# Patient Record
Sex: MALE | Race: WHITE | NOT HISPANIC OR LATINO | Employment: FULL TIME | URBAN - METROPOLITAN AREA
[De-identification: names, ages, dates, MRNs, and addresses within clinical notes are randomized per-mention and may not be internally consistent; named-entity substitution may affect disease eponyms.]

---

## 2023-11-28 ENCOUNTER — HOSPITAL ENCOUNTER (OUTPATIENT)
Dept: GENERAL RADIOLOGY | Facility: HOSPITAL | Age: 58
Discharge: HOME | End: 2023-11-28
Payer: COMMERCIAL

## 2023-11-28 ENCOUNTER — HOSPITAL ENCOUNTER (OUTPATIENT)
Dept: GENERAL RADIOLOGY | Facility: HOSPITAL | Age: 58
Discharge: HOME | End: 2023-11-28
Admitting: SURGERY
Payer: COMMERCIAL

## 2023-11-28 ENCOUNTER — OFFICE VISIT (OUTPATIENT)
Dept: SURGERY | Facility: CLINIC | Age: 58
End: 2023-11-28
Payer: COMMERCIAL

## 2023-11-28 VITALS
DIASTOLIC BLOOD PRESSURE: 88 MMHG | BODY MASS INDEX: 30.04 KG/M2 | HEIGHT: 68 IN | SYSTOLIC BLOOD PRESSURE: 146 MMHG | WEIGHT: 198.2 LBS

## 2023-11-28 DIAGNOSIS — R20.0 NUMBNESS AND TINGLING OF RIGHT LEG: ICD-10-CM

## 2023-11-28 DIAGNOSIS — R20.2 NUMBNESS AND TINGLING OF RIGHT ARM: ICD-10-CM

## 2023-11-28 DIAGNOSIS — R20.2 NUMBNESS AND TINGLING OF RIGHT LEG: ICD-10-CM

## 2023-11-28 DIAGNOSIS — K40.90 NON-RECURRENT UNILATERAL INGUINAL HERNIA WITHOUT OBSTRUCTION OR GANGRENE: ICD-10-CM

## 2023-11-28 DIAGNOSIS — R20.2 NUMBNESS AND TINGLING OF RIGHT ARM: Primary | ICD-10-CM

## 2023-11-28 DIAGNOSIS — R20.0 NUMBNESS AND TINGLING OF RIGHT ARM: ICD-10-CM

## 2023-11-28 DIAGNOSIS — R20.0 NUMBNESS AND TINGLING OF RIGHT ARM: Primary | ICD-10-CM

## 2023-11-28 PROBLEM — E66.9 OBESITY (BMI 30.0-34.9): Status: ACTIVE | Noted: 2023-11-28

## 2023-11-28 PROCEDURE — 1160F RVW MEDS BY RX/DR IN RCRD: CPT | Performed by: SURGERY

## 2023-11-28 PROCEDURE — 72070 X-RAY EXAM THORAC SPINE 2VWS: CPT

## 2023-11-28 PROCEDURE — 72100 X-RAY EXAM L-S SPINE 2/3 VWS: CPT

## 2023-11-28 PROCEDURE — 72040 X-RAY EXAM NECK SPINE 2-3 VW: CPT

## 2023-11-28 PROCEDURE — 99204 OFFICE O/P NEW MOD 45 MIN: CPT | Performed by: SURGERY

## 2023-11-28 PROCEDURE — 1159F MED LIST DOCD IN RCRD: CPT | Performed by: SURGERY

## 2023-11-28 RX ORDER — POLYETHYLENE GLYCOL 3350 17 G/17G
17 POWDER, FOR SOLUTION ORAL DAILY
Qty: 60 EACH | Refills: 2 | Status: SHIPPED | OUTPATIENT
Start: 2023-11-28

## 2023-11-28 RX ORDER — OXYCODONE HCL 10 MG/1
10 TABLET, FILM COATED, EXTENDED RELEASE ORAL ONCE
OUTPATIENT
Start: 2023-11-28 | End: 2023-11-28

## 2023-11-28 RX ORDER — LISINOPRIL 40 MG/1
1 TABLET ORAL DAILY
COMMUNITY
Start: 2023-09-21

## 2023-11-28 RX ORDER — HYDROCHLOROTHIAZIDE 12.5 MG/1
1 TABLET ORAL DAILY
COMMUNITY
Start: 2023-11-14

## 2023-11-28 RX ORDER — FLUTICASONE FUROATE, UMECLIDINIUM BROMIDE AND VILANTEROL TRIFENATATE 100; 62.5; 25 UG/1; UG/1; UG/1
1 POWDER RESPIRATORY (INHALATION) DAILY
COMMUNITY
Start: 2023-11-07

## 2023-11-28 RX ORDER — AMLODIPINE BESYLATE 10 MG/1
TABLET ORAL
COMMUNITY
Start: 2023-10-30

## 2023-11-28 RX ORDER — ONDANSETRON 2 MG/ML
4 INJECTION INTRAMUSCULAR; INTRAVENOUS EVERY 6 HOURS PRN
OUTPATIENT
Start: 2023-11-28

## 2023-11-28 RX ORDER — SCOLOPAMINE TRANSDERMAL SYSTEM 1 MG/1
1 PATCH, EXTENDED RELEASE TRANSDERMAL CONTINUOUS
OUTPATIENT
Start: 2023-11-28 | End: 2023-12-01

## 2023-11-28 RX ORDER — AMOXICILLIN 250 MG
1 CAPSULE ORAL DAILY
Qty: 90 TABLET | Refills: 2 | Status: SHIPPED | OUTPATIENT
Start: 2023-11-28

## 2023-11-28 NOTE — PATIENT INSTRUCTIONS
A high fiber diet with plenty of fluids (up to 8 glasses of water daily) is suggested to relieve these symptoms.  Metamucil, 1 tablespoon once or twice daily can be used to keep bowels regular if needed.       High-Fiber Diet  Fiber, also called dietary fiber, is a type of carbohydrate found in fruits, vegetables, whole grains, and beans. A high-fiber diet can have many health benefits. Your health care provider may recommend a high-fiber diet to help:  Prevent constipation. Fiber can make your bowel movements more regular.  Lower your cholesterol.  Relieve hemorrhoids, uncomplicated diverticulosis, or irritable bowel syndrome.  Prevent overeating as part of a weight-loss plan.  Prevent heart disease, type 2 diabetes, and certain cancers.    WHAT IS MY PLAN?  The recommended daily intake of fiber includes:  38 grams for men under age 50.  30 grams for men over age 50.  25 grams for women under age 50.  21 grams for women over age 50.  You can get the recommended daily intake of dietary fiber by eating a variety of fruits, vegetables, grains, and beans. Your health care provider may also recommend a fiber supplement if it is not possible to get enough fiber through your diet.    WHAT DO I NEED TO KNOW ABOUT A HIGH-FIBER DIET?  Fiber supplements have not been widely studied for their effectiveness, so it is better to get fiber through food sources.  Always check the fiber content on the nutrition facts label of any prepackaged food. Look for foods that contain at least 5 grams of fiber per serving.  Ask your dietitian if you have questions about specific foods that are related to your condition, especially if those foods are not listed in the following section.  Increase your daily fiber consumption gradually. Increasing your intake of dietary fiber too quickly may cause bloating, cramping, or gas.  Drink plenty of water. Water helps you to digest fiber.    WHAT FOODS CAN I EAT?  Grains  Whole-grain breads. Multigrain  cereal. Oats and oatmeal. Brown rice. Barley. Bulgur wheat. Millet. Bran muffins. Popcorn. Rye wafer crackers.  Vegetables  Sweet potatoes. Spinach. Kale. Artichokes. Cabbage. Broccoli. Green peas. Carrots. Squash.  Fruits  Berries. Pears. Apples. Oranges. Avocados. Prunes and raisins. Dried figs.  Meats and Other Protein Sources  Navy, kidney, garcia, and soy beans. Split peas. Lentils. Nuts and seeds.  Dairy  Fiber-fortified yogurt.  Beverages  Fiber-fortified soy milk. Fiber-fortified orange juice.  Other  Fiber bars.  The items listed above may not be a complete list of recommended foods or beverages. Contact your dietitian for more options.  WHAT FOODS ARE NOT RECOMMENDED?  Grains  White bread. Pasta made with refined flour. White rice.  Vegetables  Fried potatoes. Canned vegetables. Well-cooked vegetables.   Fruits  Fruit juice. Cooked, strained fruit.  Meats and Other Protein Sources  Fatty cuts of meat. Fried poultry or fried fish.  Dairy  Milk. Yogurt. Cream cheese. Sour cream.  Beverages  Soft drinks.  Other  Cakes and pastries. Butter and oils.  The items listed above may not be a complete list of foods and beverages to avoid. Contact your dietitian for more information.    WHAT ARE SOME TIPS FOR INCLUDING HIGH-FIBER FOODS IN MY DIET?  Eat a wide variety of high-fiber foods.  Make sure that half of all grains consumed each day are whole grains.  Replace breads and cereals made from refined flour or white flour with whole-grain breads and cereals.  Replace white rice with brown rice, bulgur wheat, or millet.  Start the day with a breakfast that is high in fiber, such as a cereal that contains at least 5 grams of fiber per serving.  Use beans in place of meat in soups, salads, or pasta.  Eat high-fiber snacks, such as berries, raw vegetables, nuts, or popcorn.

## 2023-11-28 NOTE — PROGRESS NOTES
Date: 2023   Patient Name: Mateo Beltran   Medical Record #: [id number]   : 1965  Age: 58 y.o.  Sex: male      Referring MD:  Referring, Self  [place] [id number]    Reason for Visit  Chief Complaint   Patient presents with    Hernia     Right inguinal hernia, about 8 months, numbness in right side of body, pain with activity, some constipation, abdomin pain, hardening in groin area,        History of Present Illness:     Mateo Beltran is a 58 y.o. male who presents to the office for evaluation of right inguinal hernia and numbness over the right side of the body.  The patient reports that for the past a month he has been having numbness and tingling over the right side of his body and more specifically over the upper extremity, the right flank as well as his right lower extremity.  He reports burning and numbness.  The burning of his arm radiates down to his hand.  The burning located over the leg is most located over the posterior aspect of the leg and goes down down to the calf.  He reports he has been having also testicular and groin pain for the past 8 months.  He reports that the pain gets worse after working long hours.  He reports abdominal distention and tenderness over the right lower quadrant that happen when constipated.  He reports intermittent episodes of constipation.  He was seen at the emergency room in the past for similar symptoms and he was felt to have a stroke.  This was ruled out with MRI/MRA of the head on 2020 that was negative.  He then underwent CT scan of cervical spine he was found to have multilevel neural foraminal stenosis with grade 1 anterolisthesis of C2 on C3.  He does not have any obstructive symptoms.  He never had a colonoscopy.    Past Medical History  Patient Active Problem List   Diagnosis    Non-recurrent unilateral inguinal hernia without obstruction or gangrene   - Asthma  -Hypertension    Past Surgical History  History  "reviewed. No pertinent surgical history.    Allergies  No Known Allergies    Medications  Current Outpatient Medications   Medication Sig Dispense Refill    amLODIPine (NORVASC) 10 MG tablet TAKE 1 TABLET BY MOUTH EVERY DAY ORAL 90      hydroCHLOROthiazide (HYDRODIURIL) 12.5 MG tablet Take 1 tablet by mouth Daily.      lisinopril (PRINIVIL,ZESTRIL) 40 MG tablet Take 1 tablet by mouth Daily.      Trelegy Ellipta 100-62.5-25 MCG/ACT inhaler Inhale 1 puff Daily.                     No current facility-administered medications for this visit.       Social History  Social History     Socioeconomic History    Marital status: Single   Tobacco Use    Smoking status: Every Day     Types: Cigarettes     Passive exposure: Never    Smokeless tobacco: Never   Vaping Use    Vaping Use: Never used   Substance and Sexual Activity    Alcohol use: Yes    Drug use: Never       Family History  History reviewed. No pertinent family history.    Review of Systems    CONSTITUTIONAL: Denies fevers, chills, unintentional weight loss or weight gain  RESPIRATORY: Denies chronic cough or sob.   CARDIAC: Denies chest pain, palpitations, edema  GI: Denies dyspepsia, reflux, heartburn, nausea, vomiting, diarrhea, reports constipation  : Denies dysuria or hematuria.    MUSCULOSKELETAL: Reports right upper extremity and right lower extremity burning and tingling  NEURO: Denies chronic headaches or weakness  ENDOCRINE: Denies significant heat or cold intolerance or history of thyroid problems.    DERM: no rashes,lesions or discharge.     Physical Exam  /88 (BP Location: Left arm, Patient Position: Sitting, Cuff Size: Adult)   Ht 172.7 cm (68\")   Wt 89.9 kg (198 lb 3.2 oz)   BMI 30.14 kg/m²   Body mass index is 30.14 kg/m².  General: Alert, no acute distress  HEENT: conjunctiva clear, moist mucus membranes  Lungs: Breathing comfortable  Cardiovascular:  Regular rate and rhythm  Extremities: Without clubbing cyanosis or edema.  There is no " motor or sensory abnormalities.  There is no pain with straight raise elevation of the right leg  Musculoskeletal: Without acute abnormality  Skin:  No rashes or hematomas, warm and moist.  Neuro: Gait normal. Sensation and strength grossly normal.  Abdominal exam: soft, nontender, nondistended, no masses or organomegaly.  There is small to moderate size is reducible right inguinal hernia.  There is mild tenderness to palpation.  There is no umbilical hernia     Medical Decision Making  Test Results:  Prior CT scan cervical spine and MRI and MRA of the head where reviewed.  All reports are available, no images available.  No significant abnormalities on MRI/MRA  Report from CT scan cervical spine showin. No evidence of acute fracture or subluxation involving the cervical spine from C1 to C6.   2. Evaluation of the spinal canal and its contents is limited at multiple levels in the cervical spine, as above.   3. No significant posterior disc bulge, disc herniation or significant central spinal canal stenosis at C2-C3 or C3-C4.   4. Multilevel neural foraminal stenosis.   5. Grade 1 anterolisthesis of C2 on C3.   6. Straightening of the normal cervical lordosis.   7. C7 vertebra not completely imaged or evaluated.       Impression:  This is a 58 y.o. male patient with a symptomatic right inguinal hernia.  He has been having numbness and tingling over the right side of his body I explained to the patient is not related to his right inguinal hernia.  He is concerned about worsening pain over the right groin.  I discussed with the patient that although his hernia may be causing right groin pain I do not think that fixing the hernia will fix the numbness and tingling over the right side of the body.  I think that he should have his hernia repair but he should undergo further workup with x-rays of the cervical thoracic as well as lumbar spine.  I think he should refer to neurology for further evaluation.  I offer  the patient robotic assisted laparoscopic right inguinal hernia repair with mesh placement.  Risk and benefits of procedure including bleeding, infection, wound mesh infection, chronic pain, hernia recurrence discussed in detail with the patient that verbalized understanding and agreed with the plan  History of chronic constipation.  Discussed with patient about the need to start fiber supplementation, high-fiber diet as well as Senokot and MiraLAX for constipation.  We discussed about the need to eventually getting  Colonoscopy for further evaluation and screening.  He is not interested in the undergoing colonoscopy for now.  BMI is >= 30 and <35. (Class 1 Obesity). The following options were offered after discussion;: weight loss educational material (shared in after visit summary), exercise counseling/recommendations, and nutrition counseling/recommendations     Plan:  X-ray cervical, thoracic and lumbar spine  Ambulatory referral to neurology  Robotic assisted laparoscopic right inguinal hernia repair with mesh placement    Orders:   Orders Placed This Encounter   Procedures    XR Spine Thoracic 2 View     Standing Status:   Future     Number of Occurrences:   1     Standing Expiration Date:   11/28/2024     Order Specific Question:   Reason for Exam:     Answer:   right arm and leg numbness     Order Specific Question:   Release to patient     Answer:   Routine Release [[id number]]    XR Spine Lumbar 2 or 3 View     Standing Status:   Future     Number of Occurrences:   1     Standing Expiration Date:   11/28/2024     Order Specific Question:   Reason for Exam:     Answer:   arm and leg numbness right     Order Specific Question:   Release to patient     Answer:   Routine Release [[id number]]    XR Spine Cervical 2 or 3 View     Standing Status:   Future     Number of Occurrences:   1     Standing Expiration Date:   11/28/2024     Order Specific Question:   Reason for Exam:     Answer:   right arm and leg  numbness     Order Specific Question:   Release to patient     Answer:   Routine Release [[id number]]    Ambulatory Referral to Neurology     Referral Priority:   Routine     Referral Type:   Consultation     Referral Reason:   Specialty Services Required     Requested Specialty:   Neurology     Number of Visits Requested:   1    Obtain Informed Consent     Standing Status:   Future     Order Specific Question:   Informed Consent Given For     Answer:   Robotic right inguinal hernia repair with mesh placement    Provide NPO Instructions to Patient     Standing Status:   Future    Chlorhexidine Skin Prep     Chlorhexidine Skin Prep and Instructions For All Patients Having A Procedure Requiring an Outward Incision if Not Allergic. If Allergic, Give Antibacterial Skin Wipes and Instructions. Do Not Use For Facial Cases or on Any Mucus Membranes.     Standing Status:   Future       Teo Euceda MD  General, Minimally Invasive and Endoscopic Surgery  [facility]    [address] [place] [address], [address]  [place], [place], [facility]  P: [phone]  F: [phone]

## 2023-11-28 NOTE — H&P (VIEW-ONLY)
Date: 2023   Patient Name: Mateo Beltran   Medical Record #: [id number]   : 1965  Age: 58 y.o.  Sex: male      Referring MD:  Referring, Self  [place] [id number]    Reason for Visit  Chief Complaint   Patient presents with    Hernia     Right inguinal hernia, about 8 months, numbness in right side of body, pain with activity, some constipation, abdomin pain, hardening in groin area,        History of Present Illness:     Mateo Beltran is a 58 y.o. male who presents to the office for evaluation of right inguinal hernia and numbness over the right side of the body.  The patient reports that for the past a month he has been having numbness and tingling over the right side of his body and more specifically over the upper extremity, the right flank as well as his right lower extremity.  He reports burning and numbness.  The burning of his arm radiates down to his hand.  The burning located over the leg is most located over the posterior aspect of the leg and goes down down to the calf.  He reports he has been having also testicular and groin pain for the past 8 months.  He reports that the pain gets worse after working long hours.  He reports abdominal distention and tenderness over the right lower quadrant that happen when constipated.  He reports intermittent episodes of constipation.  He was seen at the emergency room in the past for similar symptoms and he was felt to have a stroke.  This was ruled out with MRI/MRA of the head on 2020 that was negative.  He then underwent CT scan of cervical spine he was found to have multilevel neural foraminal stenosis with grade 1 anterolisthesis of C2 on C3.  He does not have any obstructive symptoms.  He never had a colonoscopy.    Past Medical History  Patient Active Problem List   Diagnosis    Non-recurrent unilateral inguinal hernia without obstruction or gangrene   - Asthma  -Hypertension    Past Surgical History  History  "reviewed. No pertinent surgical history.    Allergies  No Known Allergies    Medications  Current Outpatient Medications   Medication Sig Dispense Refill    amLODIPine (NORVASC) 10 MG tablet TAKE 1 TABLET BY MOUTH EVERY DAY ORAL 90      hydroCHLOROthiazide (HYDRODIURIL) 12.5 MG tablet Take 1 tablet by mouth Daily.      lisinopril (PRINIVIL,ZESTRIL) 40 MG tablet Take 1 tablet by mouth Daily.      Trelegy Ellipta 100-62.5-25 MCG/ACT inhaler Inhale 1 puff Daily.                     No current facility-administered medications for this visit.       Social History  Social History     Socioeconomic History    Marital status: Single   Tobacco Use    Smoking status: Every Day     Types: Cigarettes     Passive exposure: Never    Smokeless tobacco: Never   Vaping Use    Vaping Use: Never used   Substance and Sexual Activity    Alcohol use: Yes    Drug use: Never       Family History  History reviewed. No pertinent family history.    Review of Systems    CONSTITUTIONAL: Denies fevers, chills, unintentional weight loss or weight gain  RESPIRATORY: Denies chronic cough or sob.   CARDIAC: Denies chest pain, palpitations, edema  GI: Denies dyspepsia, reflux, heartburn, nausea, vomiting, diarrhea, reports constipation  : Denies dysuria or hematuria.    MUSCULOSKELETAL: Reports right upper extremity and right lower extremity burning and tingling  NEURO: Denies chronic headaches or weakness  ENDOCRINE: Denies significant heat or cold intolerance or history of thyroid problems.    DERM: no rashes,lesions or discharge.     Physical Exam  /88 (BP Location: Left arm, Patient Position: Sitting, Cuff Size: Adult)   Ht 172.7 cm (68\")   Wt 89.9 kg (198 lb 3.2 oz)   BMI 30.14 kg/m²   Body mass index is 30.14 kg/m².  General: Alert, no acute distress  HEENT: conjunctiva clear, moist mucus membranes  Lungs: Breathing comfortable  Cardiovascular:  Regular rate and rhythm  Extremities: Without clubbing cyanosis or edema.  There is no " motor or sensory abnormalities.  There is no pain with straight raise elevation of the right leg  Musculoskeletal: Without acute abnormality  Skin:  No rashes or hematomas, warm and moist.  Neuro: Gait normal. Sensation and strength grossly normal.  Abdominal exam: soft, nontender, nondistended, no masses or organomegaly.  There is small to moderate size is reducible right inguinal hernia.  There is mild tenderness to palpation.  There is no umbilical hernia     Medical Decision Making  Test Results:  Prior CT scan cervical spine and MRI and MRA of the head where reviewed.  All reports are available, no images available.  No significant abnormalities on MRI/MRA  Report from CT scan cervical spine showin. No evidence of acute fracture or subluxation involving the cervical spine from C1 to C6.   2. Evaluation of the spinal canal and its contents is limited at multiple levels in the cervical spine, as above.   3. No significant posterior disc bulge, disc herniation or significant central spinal canal stenosis at C2-C3 or C3-C4.   4. Multilevel neural foraminal stenosis.   5. Grade 1 anterolisthesis of C2 on C3.   6. Straightening of the normal cervical lordosis.   7. C7 vertebra not completely imaged or evaluated.       Impression:  This is a 58 y.o. male patient with a symptomatic right inguinal hernia.  He has been having numbness and tingling over the right side of his body I explained to the patient is not related to his right inguinal hernia.  He is concerned about worsening pain over the right groin.  I discussed with the patient that although his hernia may be causing right groin pain I do not think that fixing the hernia will fix the numbness and tingling over the right side of the body.  I think that he should have his hernia repair but he should undergo further workup with x-rays of the cervical thoracic as well as lumbar spine.  I think he should refer to neurology for further evaluation.  I offer  the patient robotic assisted laparoscopic right inguinal hernia repair with mesh placement.  Risk and benefits of procedure including bleeding, infection, wound mesh infection, chronic pain, hernia recurrence discussed in detail with the patient that verbalized understanding and agreed with the plan  History of chronic constipation.  Discussed with patient about the need to start fiber supplementation, high-fiber diet as well as Senokot and MiraLAX for constipation.  We discussed about the need to eventually getting  Colonoscopy for further evaluation and screening.  He is not interested in the undergoing colonoscopy for now.  BMI is >= 30 and <35. (Class 1 Obesity). The following options were offered after discussion;: weight loss educational material (shared in after visit summary), exercise counseling/recommendations, and nutrition counseling/recommendations     Plan:  X-ray cervical, thoracic and lumbar spine  Ambulatory referral to neurology  Robotic assisted laparoscopic right inguinal hernia repair with mesh placement    Orders:   Orders Placed This Encounter   Procedures    XR Spine Thoracic 2 View     Standing Status:   Future     Number of Occurrences:   1     Standing Expiration Date:   11/28/2024     Order Specific Question:   Reason for Exam:     Answer:   right arm and leg numbness     Order Specific Question:   Release to patient     Answer:   Routine Release [[id number]]    XR Spine Lumbar 2 or 3 View     Standing Status:   Future     Number of Occurrences:   1     Standing Expiration Date:   11/28/2024     Order Specific Question:   Reason for Exam:     Answer:   arm and leg numbness right     Order Specific Question:   Release to patient     Answer:   Routine Release [[id number]]    XR Spine Cervical 2 or 3 View     Standing Status:   Future     Number of Occurrences:   1     Standing Expiration Date:   11/28/2024     Order Specific Question:   Reason for Exam:     Answer:   right arm and leg  numbness     Order Specific Question:   Release to patient     Answer:   Routine Release [[id number]]    Ambulatory Referral to Neurology     Referral Priority:   Routine     Referral Type:   Consultation     Referral Reason:   Specialty Services Required     Requested Specialty:   Neurology     Number of Visits Requested:   1    Obtain Informed Consent     Standing Status:   Future     Order Specific Question:   Informed Consent Given For     Answer:   Robotic right inguinal hernia repair with mesh placement    Provide NPO Instructions to Patient     Standing Status:   Future    Chlorhexidine Skin Prep     Chlorhexidine Skin Prep and Instructions For All Patients Having A Procedure Requiring an Outward Incision if Not Allergic. If Allergic, Give Antibacterial Skin Wipes and Instructions. Do Not Use For Facial Cases or on Any Mucus Membranes.     Standing Status:   Future       Teo Euceda MD  General, Minimally Invasive and Endoscopic Surgery  [facility]    [address] [place] [address], [address]  [place], [place], [facility]  P: [phone]  F: [phone]

## 2023-12-11 ENCOUNTER — TELEPHONE (OUTPATIENT)
Dept: SURGERY | Facility: CLINIC | Age: 58
End: 2023-12-11
Payer: COMMERCIAL

## 2023-12-11 NOTE — TELEPHONE ENCOUNTER
Called and left a message to the patient regardingX-ray results    FINDINGS:     Mild anterolisthesis of C2 on C3, L4 and L5. Mild retrolisthesis of C4  on C5, C5 on C6. Alignment is otherwise in range of normal. Multilevel  endplate spurring and disc space narrowing are present, especially in  the lower cervical spine, and mid to lower thoracic spine. Multilevel  facet arthropathy is also noted. Mild vertebral body height loss is  apparent mid to lower thoracic levels, could represent mild an  age-indeterminate compression deformities, correlate with location of  pain and clinical presentation. Vertebral body heights otherwise appear  preserved. No other evidence of fracture is identified. No abnormal  cervical prevertebral soft tissue swelling. If further imaging  evaluation is indicated, MRI could be considered.    I left a message to him stating that he will need to follow-up with neurology for further evaluation.  They will decide whether they want him to have an MRI.

## 2023-12-20 ENCOUNTER — PRE-ADMISSION TESTING (OUTPATIENT)
Dept: PREADMISSION TESTING | Facility: HOSPITAL | Age: 58
End: 2023-12-20
Payer: COMMERCIAL

## 2023-12-20 VITALS
BODY MASS INDEX: 29.39 KG/M2 | RESPIRATION RATE: 16 BRPM | HEART RATE: 67 BPM | HEIGHT: 68 IN | WEIGHT: 193.9 LBS | SYSTOLIC BLOOD PRESSURE: 138 MMHG | TEMPERATURE: 98 F | DIASTOLIC BLOOD PRESSURE: 88 MMHG | OXYGEN SATURATION: 96 %

## 2023-12-20 LAB
ANION GAP SERPL CALCULATED.3IONS-SCNC: 10.2 MMOL/L (ref 5–15)
BUN SERPL-MCNC: 24 MG/DL (ref 6–20)
BUN/CREAT SERPL: 23.1 (ref 7–25)
CALCIUM SPEC-SCNC: 9.1 MG/DL (ref 8.6–10.5)
CHLORIDE SERPL-SCNC: 103 MMOL/L (ref 98–107)
CO2 SERPL-SCNC: 22.8 MMOL/L (ref 22–29)
CREAT SERPL-MCNC: 1.04 MG/DL (ref 0.76–1.27)
DEPRECATED RDW RBC AUTO: 40.3 FL (ref 37–54)
EGFRCR SERPLBLD CKD-EPI 2021: 83.7 ML/MIN/1.73
ERYTHROCYTE [DISTWIDTH] IN BLOOD BY AUTOMATED COUNT: 12.3 % (ref 12.3–15.4)
GLUCOSE SERPL-MCNC: 96 MG/DL (ref 65–99)
HCT VFR BLD AUTO: 44.8 % (ref 37.5–51)
HGB BLD-MCNC: 14.8 G/DL (ref 13–17.7)
MCH RBC QN AUTO: 30.1 PG (ref 26.6–33)
MCHC RBC AUTO-ENTMCNC: 33 G/DL (ref 31.5–35.7)
MCV RBC AUTO: 91.1 FL (ref 79–97)
PLATELET # BLD AUTO: 261 10*3/MM3 (ref 140–450)
PMV BLD AUTO: 11.6 FL (ref 6–12)
POTASSIUM SERPL-SCNC: 4.3 MMOL/L (ref 3.5–5.2)
QT INTERVAL: 403 MS
QTC INTERVAL: 416 MS
RBC # BLD AUTO: 4.92 10*6/MM3 (ref 4.14–5.8)
SODIUM SERPL-SCNC: 136 MMOL/L (ref 136–145)
WBC NRBC COR # BLD AUTO: 9.74 10*3/MM3 (ref 3.4–10.8)

## 2023-12-20 PROCEDURE — 85027 COMPLETE CBC AUTOMATED: CPT

## 2023-12-20 PROCEDURE — 93005 ELECTROCARDIOGRAM TRACING: CPT

## 2023-12-20 PROCEDURE — 80048 BASIC METABOLIC PNL TOTAL CA: CPT

## 2023-12-20 PROCEDURE — 36415 COLL VENOUS BLD VENIPUNCTURE: CPT

## 2023-12-20 NOTE — DISCHARGE INSTRUCTIONS
Take the following medications the morning of surgery: AMLODIPINE, BRING INHALERS    TIME OF ARRIVAL: 5:30 AM    If you are on prescription narcotic pain medication to control your pain you may also take that medication the morning of surgery.    General Instructions:  Do not eat solid food after midnight the night before surgery.  You may drink clear liquids day of surgery but must stop at least one hour before your hospital arrival time.  It is beneficial for you to have a clear drink that contains carbohydrates the day of surgery.  We suggest a 12 to 20 ounce bottle of Gatorade or Powerade for non-diabetic patients or a 12 to 20 ounce bottle of G2 or Powerade Zero for diabetic patients. (Pediatric patients, are not advised to drink a 12 to 20 ounce carbohydrate drink)    Clear liquids are liquids you can see through.  Nothing red in color.     Plain water                               Sports drinks  Sodas                                   Gelatin (Jell-O)  Fruit juices without pulp such as white grape juice and apple juice  Popsicles that contain no fruit or yogurt  Tea or coffee (no cream or milk added)  Gatorade / Powerade  G2 / Powerade Zero      Patients who avoid smoking, chewing tobacco and alcohol for 4 weeks prior to surgery have a reduced risk of post-operative complications.  Quit smoking as many days before surgery as you can.  Do not smoke, use chewing tobacco or drink alcohol the day of surgery.   If applicable bring your C-PAP/ BI-PAP machine in with you to preop day of surgery.  Bring any papers given to you in the doctor’s office.  Wear clean comfortable clothes.  Do not wear contact lenses, false eyelashes or make-up.  Bring a case for your glasses.   Bring crutches or walker if applicable.  Remove all piercings.  Leave jewelry and any other valuables at home.  Hair extensions with metal clips must be removed prior to surgery.  The Pre-Admission Testing nurse will instruct you to bring  medications if unable to obtain an accurate list in Pre-Admission Testing.        If you were given a blood bank ID arm band remember to bring it with you the day of surgery.    Preventing a Surgical Site Infection:  For 2 to 3 days before surgery, avoid shaving with a razor because the razor can irritate skin and make it easier to develop an infection.    Any areas of open skin can increase the risk of a post-operative wound infection by allowing bacteria to enter and travel throughout the body.  Notify your surgeon if you have any skin wounds / rashes even if it is not near the expected surgical site.  The area will need assessed to determine if surgery should be delayed until it is healed.  The night prior to surgery shower using a fresh bar of anti-bacterial soap (such as Dial) and clean washcloth.  Sleep in a clean bed with clean clothing.  Do not allow pets to sleep with you.  Shower on the morning of surgery using a fresh bar of anti-bacterial soap (such as Dial) and clean washcloth.  Dry with a clean towel and dress in clean clothing.  Ask your surgeon if you will be receiving antibiotics prior to surgery.  Make sure you, your family, and all healthcare providers clean their hands with soap and water or an alcohol based hand  before caring for you or your wound.  CHLORHEXIDINE CLOTH INSTRUCTIONS  The morning of surgery follow these instructions using the Chlorhexidine cloths you've been given.  These steps reduce bacteria on the body.  Do not use the cloths near your eyes, ears mouth, genitalia or on open wounds.  Throw the cloths away after use but do not try to flush them down a toilet.      Open and remove one cloth at a time from the package.    Leave the cloth unfolded and begin the bathing.  Massage the skin with the cloths using gentle pressure to remove bacteria.  Do not scrub harshly.   Follow the steps below with one 2% CHG cloth per area (6 total cloths).  One cloth for neck, shoulders  and chest.  One cloth for both arms, hands, fingers and underarms (do underarms last).  One cloth for the abdomen followed by groin.  One cloth for right leg and foot including between the toes.  One cloth for left leg and foot including between the toes.  The last cloth is to be used for the back of the neck, back and buttocks.    Allow the CHG to air dry 3 minutes on the skin which will give it time to work and decrease the chance of irritation.  The skin may feel sticky until it is dry.  Do not rinse with water or any other liquid or you will lose the beneficial effects of the CHG.  If mild skin irritation occurs, do rinse the skin to remove the CHG.  Report this to the nurse at time of admission.  Do not apply lotions, creams, ointments, deodorants or perfumes after using the clothes. Dress in clean clothes before coming to the hospital.    Day of surgery:  Your arrival time is approximately two hours before your scheduled surgery time.  Upon arrival, a Pre-op nurse and Anesthesiologist will review your health history, obtain vital signs, and answer questions you may have.  The only belongings needed at this time will be a list of your home medications and if applicable your C-PAP/BI-PAP machine.  A Pre-op nurse will start an IV and you may receive medication in preparation for surgery, including something to help you relax.     Please be aware that surgery does come with discomfort.  We want to make every effort to control your discomfort so please discuss any uncontrolled symptoms with your nurse.   Your doctor will most likely have prescribed pain medications.      If you are going home after surgery you will receive individualized written care instructions before being discharged.  A responsible adult must drive you to and from the hospital on the day of your surgery and stay with you for 24 hours.  Discharge prescriptions can be filled by the hospital pharmacy during regular pharmacy hours.  If you are  having surgery late in the day/evening your prescription may be e-prescribed to your pharmacy.  Please verify your pharmacy hours or chose a 24 hour pharmacy to avoid not having access to your prescription because your pharmacy has closed for the day.    If you are staying overnight following surgery, you will be transported to your hospital room following the recovery period.  [facility] has all private rooms.    If you have any questions please call Pre-Admission Testing at [phone].  Deductibles and co-payments are collected on the day of service. Please be prepared to pay the required co-pay, deductible or deposit on the day of service as defined by your plan.    Call your surgeon immediately if you experience any of the following symptoms:  Sore Throat  Shortness of Breath or difficulty breathing  Cough  Chills  Body soreness or muscle pain  Headache  Fever  New loss of taste or smell  Do not arrive for your surgery ill.  Your procedure will need to be rescheduled to another time.  You will need to call your physician before the day of surgery to avoid any unnecessary exposure to hospital staff as well as other patients.

## 2023-12-21 ENCOUNTER — TELEPHONE (OUTPATIENT)
Dept: SURGERY | Facility: CLINIC | Age: 58
End: 2023-12-21
Payer: COMMERCIAL

## 2023-12-27 ENCOUNTER — ANESTHESIA EVENT (OUTPATIENT)
Dept: PERIOP | Facility: HOSPITAL | Age: 58
End: 2023-12-27
Payer: COMMERCIAL

## 2023-12-27 ENCOUNTER — ANESTHESIA (OUTPATIENT)
Dept: PERIOP | Facility: HOSPITAL | Age: 58
End: 2023-12-27
Payer: COMMERCIAL

## 2023-12-27 ENCOUNTER — HOSPITAL ENCOUNTER (OUTPATIENT)
Facility: HOSPITAL | Age: 58
Setting detail: HOSPITAL OUTPATIENT SURGERY
Discharge: HOME | End: 2023-12-27
Attending: SURGERY | Admitting: SURGERY
Payer: COMMERCIAL

## 2023-12-27 VITALS
WEIGHT: 193.78 LBS | DIASTOLIC BLOOD PRESSURE: 96 MMHG | SYSTOLIC BLOOD PRESSURE: 135 MMHG | OXYGEN SATURATION: 95 % | TEMPERATURE: 97.9 F | HEIGHT: 68 IN | HEART RATE: 55 BPM | BODY MASS INDEX: 29.37 KG/M2 | RESPIRATION RATE: 16 BRPM

## 2023-12-27 DIAGNOSIS — K40.90 NON-RECURRENT UNILATERAL INGUINAL HERNIA WITHOUT OBSTRUCTION OR GANGRENE: ICD-10-CM

## 2023-12-27 PROCEDURE — 25010000002 PROPOFOL 200 MG/20ML EMULSION: Performed by: NURSE ANESTHETIST, CERTIFIED REGISTERED

## 2023-12-27 PROCEDURE — 25010000002 FENTANYL CITRATE (PF) 50 MCG/ML SOLUTION: Performed by: NURSE ANESTHETIST, CERTIFIED REGISTERED

## 2023-12-27 PROCEDURE — 88302 TISSUE EXAM BY PATHOLOGIST: CPT | Performed by: SURGERY

## 2023-12-27 PROCEDURE — 25010000002 SUGAMMADEX 200 MG/2ML SOLUTION: Performed by: NURSE ANESTHETIST, CERTIFIED REGISTERED

## 2023-12-27 PROCEDURE — 25010000002 ONDANSETRON PER 1 MG: Performed by: NURSE ANESTHETIST, CERTIFIED REGISTERED

## 2023-12-27 PROCEDURE — 25010000002 DEXAMETHASONE SODIUM PHOSPHATE 20 MG/5ML SOLUTION: Performed by: NURSE ANESTHETIST, CERTIFIED REGISTERED

## 2023-12-27 PROCEDURE — 25010000002 MAGNESIUM SULFATE PER 500 MG OF MAGNESIUM: Performed by: NURSE ANESTHETIST, CERTIFIED REGISTERED

## 2023-12-27 PROCEDURE — 25810000003 LACTATED RINGERS PER 1000 ML: Performed by: NURSE ANESTHETIST, CERTIFIED REGISTERED

## 2023-12-27 PROCEDURE — C1781 MESH (IMPLANTABLE): HCPCS | Performed by: SURGERY

## 2023-12-27 PROCEDURE — 25810000003 LACTATED RINGERS PER 1000 ML: Performed by: ANESTHESIOLOGY

## 2023-12-27 PROCEDURE — 88304 TISSUE EXAM BY PATHOLOGIST: CPT | Performed by: SURGERY

## 2023-12-27 PROCEDURE — 25010000002 CEFAZOLIN IN DEXTROSE 2-4 GM/100ML-% SOLUTION: Performed by: SURGERY

## 2023-12-27 DEVICE — KNOTLESS TISSUE CONTROL DEVICE, UNDYED UNIDIRECTIONAL (ANTIBACTERIAL) SYNTHETIC ABSORBABLE DEVICE
Type: IMPLANTABLE DEVICE | Site: ABDOMEN | Status: FUNCTIONAL
Brand: STRATAFIX

## 2023-12-27 DEVICE — BARD 3DMAX MESH RIGHT EXTRA LARGE
Type: IMPLANTABLE DEVICE | Site: ABDOMEN | Status: FUNCTIONAL
Brand: BARD 3DMAX MESH

## 2023-12-27 RX ORDER — HYDROCODONE BITARTRATE AND ACETAMINOPHEN 5; 325 MG/1; MG/1
1 TABLET ORAL ONCE AS NEEDED
Status: DISCONTINUED | OUTPATIENT
Start: 2023-12-27 | End: 2023-12-27 | Stop reason: HOSPADM

## 2023-12-27 RX ORDER — DROPERIDOL 2.5 MG/ML
0.62 INJECTION, SOLUTION INTRAMUSCULAR; INTRAVENOUS
Status: DISCONTINUED | OUTPATIENT
Start: 2023-12-27 | End: 2023-12-27 | Stop reason: HOSPADM

## 2023-12-27 RX ORDER — PROMETHAZINE HYDROCHLORIDE 25 MG/1
12.5 TABLET ORAL ONCE AS NEEDED
Status: DISCONTINUED | OUTPATIENT
Start: 2023-12-27 | End: 2023-12-27 | Stop reason: HOSPADM

## 2023-12-27 RX ORDER — PROPOFOL 10 MG/ML
INJECTION, EMULSION INTRAVENOUS AS NEEDED
Status: DISCONTINUED | OUTPATIENT
Start: 2023-12-27 | End: 2023-12-27 | Stop reason: SURG

## 2023-12-27 RX ORDER — SODIUM CHLORIDE, SODIUM LACTATE, POTASSIUM CHLORIDE, CALCIUM CHLORIDE 600; 310; 30; 20 MG/100ML; MG/100ML; MG/100ML; MG/100ML
INJECTION, SOLUTION INTRAVENOUS CONTINUOUS PRN
Status: DISCONTINUED | OUTPATIENT
Start: 2023-12-27 | End: 2023-12-27 | Stop reason: SURG

## 2023-12-27 RX ORDER — MAGNESIUM HYDROXIDE 1200 MG/15ML
LIQUID ORAL AS NEEDED
Status: DISCONTINUED | OUTPATIENT
Start: 2023-12-27 | End: 2023-12-27 | Stop reason: HOSPADM

## 2023-12-27 RX ORDER — FAMOTIDINE 10 MG/ML
20 INJECTION, SOLUTION INTRAVENOUS ONCE
Status: COMPLETED | OUTPATIENT
Start: 2023-12-27 | End: 2023-12-27

## 2023-12-27 RX ORDER — GLYCOPYRROLATE 0.2 MG/ML
INJECTION INTRAMUSCULAR; INTRAVENOUS AS NEEDED
Status: DISCONTINUED | OUTPATIENT
Start: 2023-12-27 | End: 2023-12-27 | Stop reason: SURG

## 2023-12-27 RX ORDER — SODIUM CHLORIDE 0.9 % (FLUSH) 0.9 %
3-10 SYRINGE (ML) INJECTION AS NEEDED
Status: DISCONTINUED | OUTPATIENT
Start: 2023-12-27 | End: 2023-12-27 | Stop reason: HOSPADM

## 2023-12-27 RX ORDER — LABETALOL HYDROCHLORIDE 5 MG/ML
5 INJECTION, SOLUTION INTRAVENOUS
Status: DISCONTINUED | OUTPATIENT
Start: 2023-12-27 | End: 2023-12-27 | Stop reason: HOSPADM

## 2023-12-27 RX ORDER — SCOLOPAMINE TRANSDERMAL SYSTEM 1 MG/1
1 PATCH, EXTENDED RELEASE TRANSDERMAL CONTINUOUS
Status: DISCONTINUED | OUTPATIENT
Start: 2023-12-27 | End: 2023-12-27 | Stop reason: HOSPADM

## 2023-12-27 RX ORDER — LIDOCAINE HYDROCHLORIDE 20 MG/ML
INJECTION, SOLUTION INFILTRATION; PERINEURAL AS NEEDED
Status: DISCONTINUED | OUTPATIENT
Start: 2023-12-27 | End: 2023-12-27 | Stop reason: SURG

## 2023-12-27 RX ORDER — NALOXONE HCL 0.4 MG/ML
0.2 VIAL (ML) INJECTION AS NEEDED
Status: DISCONTINUED | OUTPATIENT
Start: 2023-12-27 | End: 2023-12-27 | Stop reason: HOSPADM

## 2023-12-27 RX ORDER — FLUMAZENIL 0.1 MG/ML
0.2 INJECTION INTRAVENOUS AS NEEDED
Status: DISCONTINUED | OUTPATIENT
Start: 2023-12-27 | End: 2023-12-27 | Stop reason: HOSPADM

## 2023-12-27 RX ORDER — FENTANYL CITRATE 50 UG/ML
INJECTION, SOLUTION INTRAMUSCULAR; INTRAVENOUS AS NEEDED
Status: DISCONTINUED | OUTPATIENT
Start: 2023-12-27 | End: 2023-12-27 | Stop reason: SURG

## 2023-12-27 RX ORDER — ONDANSETRON 2 MG/ML
INJECTION INTRAMUSCULAR; INTRAVENOUS AS NEEDED
Status: DISCONTINUED | OUTPATIENT
Start: 2023-12-27 | End: 2023-12-27 | Stop reason: SURG

## 2023-12-27 RX ORDER — HYDROMORPHONE HYDROCHLORIDE 1 MG/ML
0.5 INJECTION, SOLUTION INTRAMUSCULAR; INTRAVENOUS; SUBCUTANEOUS
Status: DISCONTINUED | OUTPATIENT
Start: 2023-12-27 | End: 2023-12-27 | Stop reason: HOSPADM

## 2023-12-27 RX ORDER — PROMETHAZINE HYDROCHLORIDE 25 MG/1
25 TABLET ORAL ONCE AS NEEDED
Status: DISCONTINUED | OUTPATIENT
Start: 2023-12-27 | End: 2023-12-27 | Stop reason: HOSPADM

## 2023-12-27 RX ORDER — OXYCODONE AND ACETAMINOPHEN 7.5; 325 MG/1; MG/1
1 TABLET ORAL EVERY 4 HOURS PRN
Status: DISCONTINUED | OUTPATIENT
Start: 2023-12-27 | End: 2023-12-27 | Stop reason: HOSPADM

## 2023-12-27 RX ORDER — HYDRALAZINE HYDROCHLORIDE 20 MG/ML
5 INJECTION INTRAMUSCULAR; INTRAVENOUS
Status: DISCONTINUED | OUTPATIENT
Start: 2023-12-27 | End: 2023-12-27 | Stop reason: HOSPADM

## 2023-12-27 RX ORDER — DEXAMETHASONE SODIUM PHOSPHATE 4 MG/ML
INJECTION, SOLUTION INTRA-ARTICULAR; INTRALESIONAL; INTRAMUSCULAR; INTRAVENOUS; SOFT TISSUE AS NEEDED
Status: DISCONTINUED | OUTPATIENT
Start: 2023-12-27 | End: 2023-12-27 | Stop reason: SURG

## 2023-12-27 RX ORDER — LIDOCAINE HYDROCHLORIDE 10 MG/ML
0.5 INJECTION, SOLUTION INFILTRATION; PERINEURAL ONCE AS NEEDED
Status: DISCONTINUED | OUTPATIENT
Start: 2023-12-27 | End: 2023-12-27 | Stop reason: HOSPADM

## 2023-12-27 RX ORDER — DOCUSATE SODIUM 100 MG/1
100 CAPSULE, LIQUID FILLED ORAL 2 TIMES DAILY PRN
Status: DISCONTINUED | OUTPATIENT
Start: 2023-12-27 | End: 2023-12-27 | Stop reason: HOSPADM

## 2023-12-27 RX ORDER — FENTANYL CITRATE 50 UG/ML
50 INJECTION, SOLUTION INTRAMUSCULAR; INTRAVENOUS ONCE AS NEEDED
Status: DISCONTINUED | OUTPATIENT
Start: 2023-12-27 | End: 2023-12-27 | Stop reason: HOSPADM

## 2023-12-27 RX ORDER — ONDANSETRON 2 MG/ML
4 INJECTION INTRAMUSCULAR; INTRAVENOUS ONCE AS NEEDED
Status: DISCONTINUED | OUTPATIENT
Start: 2023-12-27 | End: 2023-12-27 | Stop reason: HOSPADM

## 2023-12-27 RX ORDER — MAGNESIUM SULFATE HEPTAHYDRATE 500 MG/ML
INJECTION, SOLUTION INTRAMUSCULAR; INTRAVENOUS AS NEEDED
Status: DISCONTINUED | OUTPATIENT
Start: 2023-12-27 | End: 2023-12-27 | Stop reason: SURG

## 2023-12-27 RX ORDER — SODIUM CHLORIDE, SODIUM LACTATE, POTASSIUM CHLORIDE, CALCIUM CHLORIDE 600; 310; 30; 20 MG/100ML; MG/100ML; MG/100ML; MG/100ML
9 INJECTION, SOLUTION INTRAVENOUS CONTINUOUS
Status: DISCONTINUED | OUTPATIENT
Start: 2023-12-27 | End: 2023-12-27 | Stop reason: HOSPADM

## 2023-12-27 RX ORDER — BUPIVACAINE HYDROCHLORIDE AND EPINEPHRINE 5; 5 MG/ML; UG/ML
INJECTION, SOLUTION EPIDURAL; INTRACAUDAL; PERINEURAL AS NEEDED
Status: DISCONTINUED | OUTPATIENT
Start: 2023-12-27 | End: 2023-12-27 | Stop reason: HOSPADM

## 2023-12-27 RX ORDER — ONDANSETRON 2 MG/ML
4 INJECTION INTRAMUSCULAR; INTRAVENOUS EVERY 6 HOURS PRN
Status: DISCONTINUED | OUTPATIENT
Start: 2023-12-27 | End: 2023-12-27 | Stop reason: HOSPADM

## 2023-12-27 RX ORDER — ROCURONIUM BROMIDE 10 MG/ML
INJECTION, SOLUTION INTRAVENOUS AS NEEDED
Status: DISCONTINUED | OUTPATIENT
Start: 2023-12-27 | End: 2023-12-27 | Stop reason: SURG

## 2023-12-27 RX ORDER — PROMETHAZINE HYDROCHLORIDE 25 MG/1
25 SUPPOSITORY RECTAL ONCE AS NEEDED
Status: DISCONTINUED | OUTPATIENT
Start: 2023-12-27 | End: 2023-12-27 | Stop reason: HOSPADM

## 2023-12-27 RX ORDER — MIDAZOLAM HYDROCHLORIDE 1 MG/ML
1 INJECTION INTRAMUSCULAR; INTRAVENOUS
Status: DISCONTINUED | OUTPATIENT
Start: 2023-12-27 | End: 2023-12-27 | Stop reason: HOSPADM

## 2023-12-27 RX ORDER — OXYCODONE HYDROCHLORIDE AND ACETAMINOPHEN 5; 325 MG/1; MG/1
1 TABLET ORAL EVERY 6 HOURS PRN
Qty: 20 TABLET | Refills: 0 | Status: SHIPPED | OUTPATIENT
Start: 2023-12-27

## 2023-12-27 RX ORDER — OXYCODONE HYDROCHLORIDE AND ACETAMINOPHEN 5; 325 MG/1; MG/1
1 TABLET ORAL ONCE AS NEEDED
Status: DISCONTINUED | OUTPATIENT
Start: 2023-12-27 | End: 2023-12-27 | Stop reason: HOSPADM

## 2023-12-27 RX ORDER — OXYCODONE HCL 10 MG/1
10 TABLET, FILM COATED, EXTENDED RELEASE ORAL ONCE
Status: COMPLETED | OUTPATIENT
Start: 2023-12-27 | End: 2023-12-27

## 2023-12-27 RX ORDER — FENTANYL CITRATE 50 UG/ML
50 INJECTION, SOLUTION INTRAMUSCULAR; INTRAVENOUS
Status: DISCONTINUED | OUTPATIENT
Start: 2023-12-27 | End: 2023-12-27 | Stop reason: HOSPADM

## 2023-12-27 RX ORDER — IPRATROPIUM BROMIDE AND ALBUTEROL SULFATE 2.5; .5 MG/3ML; MG/3ML
3 SOLUTION RESPIRATORY (INHALATION) ONCE AS NEEDED
Status: DISCONTINUED | OUTPATIENT
Start: 2023-12-27 | End: 2023-12-27 | Stop reason: HOSPADM

## 2023-12-27 RX ORDER — DIPHENHYDRAMINE HYDROCHLORIDE 50 MG/ML
12.5 INJECTION INTRAMUSCULAR; INTRAVENOUS
Status: DISCONTINUED | OUTPATIENT
Start: 2023-12-27 | End: 2023-12-27 | Stop reason: HOSPADM

## 2023-12-27 RX ORDER — SODIUM CHLORIDE 0.9 % (FLUSH) 0.9 %
3 SYRINGE (ML) INJECTION EVERY 12 HOURS SCHEDULED
Status: DISCONTINUED | OUTPATIENT
Start: 2023-12-27 | End: 2023-12-27 | Stop reason: HOSPADM

## 2023-12-27 RX ORDER — CEFAZOLIN SODIUM 2 G/100ML
2000 INJECTION, SOLUTION INTRAVENOUS ONCE
Status: COMPLETED | OUTPATIENT
Start: 2023-12-27 | End: 2023-12-27

## 2023-12-27 RX ORDER — ONDANSETRON 4 MG/1
4 TABLET, FILM COATED ORAL EVERY 8 HOURS PRN
Qty: 10 TABLET | Refills: 1 | Status: SHIPPED | OUTPATIENT
Start: 2023-12-27 | End: 2024-12-26

## 2023-12-27 RX ORDER — EPHEDRINE SULFATE 50 MG/ML
5 INJECTION, SOLUTION INTRAVENOUS ONCE AS NEEDED
Status: DISCONTINUED | OUTPATIENT
Start: 2023-12-27 | End: 2023-12-27 | Stop reason: HOSPADM

## 2023-12-27 RX ADMIN — FAMOTIDINE 20 MG: 10 INJECTION INTRAVENOUS at 06:24

## 2023-12-27 RX ADMIN — OXYCODONE HYDROCHLORIDE 10 MG: 10 TABLET, FILM COATED, EXTENDED RELEASE ORAL at 06:24

## 2023-12-27 RX ADMIN — ONDANSETRON 4 MG: 2 INJECTION INTRAMUSCULAR; INTRAVENOUS at 09:13

## 2023-12-27 RX ADMIN — FENTANYL CITRATE 50 MCG: 50 INJECTION, SOLUTION INTRAMUSCULAR; INTRAVENOUS at 07:17

## 2023-12-27 RX ADMIN — LIDOCAINE HYDROCHLORIDE 100 MG: 20 INJECTION, SOLUTION INFILTRATION; PERINEURAL at 07:17

## 2023-12-27 RX ADMIN — GLYCOPYRROLATE 0.1 MG: 0.2 INJECTION INTRAMUSCULAR; INTRAVENOUS at 07:28

## 2023-12-27 RX ADMIN — OXYCODONE AND ACETAMINOPHEN 1 TABLET: 7.5; 325 TABLET ORAL at 10:16

## 2023-12-27 RX ADMIN — SODIUM CHLORIDE, POTASSIUM CHLORIDE, SODIUM LACTATE AND CALCIUM CHLORIDE 9 ML/HR: 600; 310; 30; 20 INJECTION, SOLUTION INTRAVENOUS at 06:05

## 2023-12-27 RX ADMIN — SODIUM CHLORIDE, POTASSIUM CHLORIDE, SODIUM LACTATE AND CALCIUM CHLORIDE: 600; 310; 30; 20 INJECTION, SOLUTION INTRAVENOUS at 07:08

## 2023-12-27 RX ADMIN — PROPOFOL 150 MG: 10 INJECTION, EMULSION INTRAVENOUS at 07:17

## 2023-12-27 RX ADMIN — FENTANYL CITRATE 50 MCG: 50 INJECTION, SOLUTION INTRAMUSCULAR; INTRAVENOUS at 10:32

## 2023-12-27 RX ADMIN — ROCURONIUM BROMIDE 50 MG: 10 INJECTION, SOLUTION INTRAVENOUS at 07:17

## 2023-12-27 RX ADMIN — FENTANYL CITRATE 50 MCG: 50 INJECTION, SOLUTION INTRAMUSCULAR; INTRAVENOUS at 10:04

## 2023-12-27 RX ADMIN — SUGAMMADEX 200 MG: 100 INJECTION, SOLUTION INTRAVENOUS at 09:13

## 2023-12-27 RX ADMIN — DEXAMETHASONE SODIUM PHOSPHATE 8 MG: 4 INJECTION, SOLUTION INTRAMUSCULAR; INTRAVENOUS at 07:28

## 2023-12-27 RX ADMIN — FENTANYL CITRATE 50 MCG: 50 INJECTION, SOLUTION INTRAMUSCULAR; INTRAVENOUS at 10:16

## 2023-12-27 RX ADMIN — CEFAZOLIN SODIUM 2000 MG: 2 INJECTION, SOLUTION INTRAVENOUS at 07:03

## 2023-12-27 RX ADMIN — FENTANYL CITRATE 50 MCG: 50 INJECTION, SOLUTION INTRAMUSCULAR; INTRAVENOUS at 09:55

## 2023-12-27 RX ADMIN — FENTANYL CITRATE 50 MCG: 50 INJECTION, SOLUTION INTRAMUSCULAR; INTRAVENOUS at 09:13

## 2023-12-27 RX ADMIN — MAGNESIUM SULFATE HEPTAHYDRATE 2 G: 500 INJECTION, SOLUTION INTRAMUSCULAR; INTRAVENOUS at 07:36

## 2023-12-27 RX ADMIN — ROCURONIUM BROMIDE 20 MG: 10 INJECTION, SOLUTION INTRAVENOUS at 08:27

## 2023-12-27 NOTE — ANESTHESIA PROCEDURE NOTES
Airway  Urgency: elective    Date/Time: 12/27/2023 7:20 AM  Airway not difficult    General Information and Staff    Patient location during procedure: OR    Indications and Patient Condition  Indications for airway management: airway protection    Preoxygenated: yes  Mask difficulty assessment: 3 - difficult mask (inadequate, unstable or two providers) +/- NMBA    Final Airway Details  Final airway type: endotracheal airway      Successful airway: ETT  Cuffed: yes   Successful intubation technique: direct laryngoscopy  Facilitating devices/methods: intubating stylet  Endotracheal tube insertion site: oral  Blade: Randal  Blade size: 4  ETT size (mm): 7.5  Cormack-Lehane Classification: grade IIa - partial view of glottis  Placement verified by: chest auscultation and capnometry   Measured from: lips  ETT/EBT  to lips (cm): 23  Number of attempts at approach: 1  Assessment: lips, teeth, and gum same as pre-op and atraumatic intubation

## 2023-12-27 NOTE — ANESTHESIA PREPROCEDURE EVALUATION
Anesthesia Evaluation     Patient summary reviewed and Nursing notes reviewed   NPO Solid Status: > 8 hours  NPO Liquid Status: > 4 hours           Airway   Mallampati: II  TM distance: >3 FB  Neck ROM: full  No difficulty expected  Dental    (+) edentulous    Pulmonary - normal exam    breath sounds clear to auscultation  (+) a smoker Current, Abstained day of surgery, asthma,  Cardiovascular - normal exam    ECG reviewed  Rhythm: regular  Rate: normal    (+) hypertension 2 medications or greater      Neuro/Psych  (+) numbness, psychiatric history Depression    ROS Comment: Numbness and tingling right arm and hand after a fall and shoulder dislocation (yet to be fully evaluated but no neck pain)  GI/Hepatic/Renal/Endo      Musculoskeletal     Abdominal  - normal exam   Substance History   (+) drug use      Comment: Marijuana   OB/GYN          Other                      Anesthesia Plan    ASA 2     general     intravenous induction     Anesthetic plan, risks, benefits, and alternatives have been provided, discussed and informed consent has been obtained with: patient.      CODE STATUS:          Anesthesia Type: 1% lidocaine with epinephrine

## 2023-12-27 NOTE — DISCHARGE INSTRUCTIONS
Dr. Teo Euceda  [address]  [place], [place] [address] [phone]    Discharge Instructions for Hernia Surgery      Go home, rest and take it easy today; however, you should get up and move about several times today to reduce the risk of developing a clot in your legs.      You may experience some dizziness or memory loss from the anesthesia.  This may last for the next 24 hours.  Someone should plan on staying with you for the first 24 hours for your safety.    Do not make any important legal decisions or sign any legal papers for the next 24 hours.      Eat and drink lightly today.  Start off with liquids, jello, soup, crackers or other bland foods at first. You may advance your diet tomorrow as tolerated as long as you do not experience any nausea or vomiting.     You may remove your outer dressings in 3 days.  The white tapes called steri-strips should stay in place.  They will fall off on their own in 1-2 weeks.  Do not worry if they come off sooner.      You may notice some bleeding/drainage on your outer dressings. A little bloody drainage is normal. If the bleeding/drainage is such that the bandage cannot absorb it, remove the dressing, apply clean gauze and apply firm pressure for a full 15 minutes.  If the bleeding continues, please call me.    You may shower tomorrow.  No tub baths until your incisions are completely healed.      No lifting > 20 lbs. until you are seen at your follow-up visit.         You have received a prescription for a narcotic pain medicine, as you will have some pain following surgery.   You will not be totally pain free, but your pain medicine should make the pain tolerable.  Please take your pain medicine as prescribed and always take your pills with food to prevent nausea. If you are having severe pain that cannot be controlled by the pain medicine, please contact me.      You have also received a prescription for an anti-nausea medicine.  Please take this as  prescribed for any nausea or vomiting.  Nausea could be a result of the anesthesia or a result of the narcotic pain medicine.  If you experience severe nausea and vomiting that cannot be controlled by the nausea medicine, please call me.      If you had a laparoscopic surgery, it is not unusual to experience pain/discomfort in your shoulders or under your ribs after surgery.  It is from the gas used during the laparoscopic procedure and usually lasts 1-3 days.  The prescription pain medicine is used to treat the surgical pain and does not typically alleviate this “gassy” pain.     No driving for 24 hours and for as long as you are taking your prescription pain medicine.      You will need to call the office at [phone] to schedule a follow-up appointment in 6-10 days.     Remember to contact me for any of the following:    Fever > 101 degrees  Severe pain that cannot be controlled by taking your pain pills  Severe nausea or vomiting that cannot be controlled by taking your nausea pills  Significant bleeding of your incisions  Drainage that has a bad smell or is yellow or green in appearance  Any other questions or concerns    Additional Instruction for Inguinal Hernia Patients Only    If you did not urinate at the hospital after your surgery or if you feel the need to urinate and cannot, this will necessitate a return to the Emergency Room for placement of a urinary catheter.  You should also notify me as well.  As a rule, you should be able to empty your bladder within 4-6 hours after discharge from the hospital.      You may notice some scrotal bruising and/or swelling. A scrotal support or briefs as well as ice packs may be used to alleviate discomfort.

## 2023-12-27 NOTE — ANESTHESIA POSTPROCEDURE EVALUATION
Patient: Mateo Beltran    Procedure Summary       Date: 12/27/23 Room / Location: [facility] OR [address] [place] [address] MAIN OR    Anesthesia Start: 0708 Anesthesia Stop: 0931    Procedure: INGUINAL HERNIA REPAIR LAPAROSCOPIC WITH DAVINCI ROBOT (Right: Abdomen) Diagnosis:       Non-recurrent unilateral inguinal hernia without obstruction or gangrene      (Non-recurrent unilateral inguinal hernia without obstruction or gangrene [K40.90])    Surgeons: Teo Euceda MD Provider: Jg Villarreal MD    Anesthesia Type: general ASA Status: 2            Anesthesia Type: general    Vitals  Vitals Value Taken Time   /103 12/27/23 1245   Temp 36.6 °C (97.9 °F) 12/27/23 0927   Pulse 69 12/27/23 1255   Resp 17 12/27/23 1245   SpO2 92 % 12/27/23 1254   Vitals shown include unfiled device data.        Post Anesthesia Care and Evaluation    Patient location during evaluation: PHASE II  Patient participation: complete - patient participated  Level of consciousness: awake and alert  Pain management: adequate    Airway patency: patent  Anesthetic complications: No anesthetic complications  PONV Status: none  Cardiovascular status: acceptable and hemodynamically stable  Respiratory status: acceptable, nonlabored ventilation and spontaneous ventilation  Hydration status: acceptable

## 2023-12-27 NOTE — NURSING NOTE
Spoke with Dr. Villarreal regarding pts o2 sats hanging between 91-94%. He is aware that pt is a smoker, and is OK with him proceeding to phase II.

## 2023-12-28 LAB
LAB AP CASE REPORT: NORMAL
LAB AP CLINICAL INFORMATION: NORMAL
PATH REPORT.FINAL DX SPEC: NORMAL
PATH REPORT.GROSS SPEC: NORMAL

## 2024-01-03 ENCOUNTER — TELEPHONE (OUTPATIENT)
Dept: SURGERY | Facility: CLINIC | Age: 59
End: 2024-01-03
Payer: COMMERCIAL

## 2024-01-03 NOTE — TELEPHONE ENCOUNTER
Spoke to pt regarding the $25 payment for FMLA/STD paperwork. He first tried paying with cash & when he was told that we're not accepting cash, he tried to use a debit card. The transaction wouldn't work at that time so he called into the office today to pay. Got his card information & gave to Steffi to run.

## 2024-01-10 ENCOUNTER — OFFICE VISIT (OUTPATIENT)
Dept: SURGERY | Facility: CLINIC | Age: 59
End: 2024-01-10
Payer: COMMERCIAL

## 2024-01-10 VITALS
DIASTOLIC BLOOD PRESSURE: 92 MMHG | WEIGHT: 195 LBS | BODY MASS INDEX: 29.55 KG/M2 | HEIGHT: 68 IN | SYSTOLIC BLOOD PRESSURE: 160 MMHG

## 2024-01-10 DIAGNOSIS — Z09 ENCOUNTER FOR FOLLOW-UP: Primary | ICD-10-CM

## 2024-01-10 NOTE — LETTER
January 10, 2024     Patient: Mateo Beltran   YOB: 1965   Date of Visit: 1/10/2024       To Whom It May Concern:    It is my medical opinion that Mateo Beltran may return to work on 1/27/24 without restrictions .           Sincerely,        Pedro Millard PA-C    CC:   No Recipients

## 2024-01-10 NOTE — PROGRESS NOTES
CC:  Postop follow-up    S:  58-year-old gentleman returning to the office today status post da Ender assisted laparoscopic right inguinal hernia repair that was performed on 12/27/2023.  He is doing well since surgery having minimal discomfort.  He is still being very cautious as far as activities go and has not returned to work yet.  He is eating well, having normal bowel movements and has no complaints.    O:  Vital signs noted  Incision is well-healed, skin glue peeling off, nontender throughout his abdomen    A/P:  Cautioned him to slowly return to his normal activities using his body as his guide with no strenuous activity until he is 4 weeks out from surgery.  I did write a return to work note for him for 1/27/2024 without restrictions.  All questions were answered and he was willing to proceed with all recommendations.    Pedro Millard PA-C

## 2024-01-30 ENCOUNTER — TELEPHONE (OUTPATIENT)
Dept: SURGERY | Facility: CLINIC | Age: 59
End: 2024-01-30
Payer: COMMERCIAL

## 2024-01-30 NOTE — TELEPHONE ENCOUNTER
Pt called to verify when his appt is scheduled with Dr Dial. Advised him that it's on 4/4 @ 4 pm.

## 2024-03-11 ENCOUNTER — TELEPHONE (OUTPATIENT)
Dept: NEUROLOGY | Facility: CLINIC | Age: 59
End: 2024-03-11
Payer: COMMERCIAL

## 2024-03-11 NOTE — TELEPHONE ENCOUNTER
Left vm, MyChart (if applicable) & mailed reminder regarding appt reschedule due to provider being out of office.

## 2024-06-14 NOTE — PROGRESS NOTES
"Chief Complaint   Patient presents with    Numbness     np       Patient ID: Mateo Beltran is a 58 y.o. male.    HPI:    The following portions of the patient's history were reviewed and updated as appropriate: allergies, current medications, past family history, past medical history, past social history, past surgical history and problem list.    Interval history:  History of Present Illness  The patient is a 58-year-old male who presents to neurology clinic as a referral for numbness and tingling of the right arm and right leg.  He was seen by a minimally invasive endoscopic surgeon, Dr. Euceda. He had a symptomatic right inguinal hernia and he reported numbness and tingling over the right side of his body, right upper extremity, right flank, and right lower extremity. He had burning in his arm that radiates down to his hand. The pain gets worse after it is burning over the posterior aspect of the leg and goes down to the calf. He has had symptoms in the past that were similar and he has had some scans to work this up including an MRI and MRA of the head done back in 2022. He has severe chronic small vessel changes, but no signs of stroke reported in the brain in 2022 and this was for right-sided body symptoms back then that he says are the same symptoms he has now. He had a CT done of his neck and cervical spine and it showed limited evaluation at several levels, but there is grade 1 anterolisthesis on C2 on C3. There was being hardening artifact related to the shoulders below the level of C4 which limits evaluation.    The patient recounts an incident approximately a year ago (per EMR this is 2022) when he experienced numbness on the right side of his body while sitting. He had hernia surgery which helped with \"nut\" pain.   He also reports pain in his right leg, specifically the bottom of his lower thigh.  He recalls a fall from a roof a few years ago, but did not sustain any known neck injuries.    He has a " history of hypertension, which has since improved. He describes his symptoms as numbness and tingling in his right arm, chest, right flank, right side of his back, and entire right leg. He denies any lower back pain or left-sided symptoms. He describes his chest as feeling hard and built up. He denies any neck pain. His symptoms have not worsened over the last year. He works full-time for 5 weekdays. He also reports a sensation of fullness in his right ear, which began concurrent with the numbness in his arm and leg. He denies any right-sided face numbness. His chest and back symptoms began approximately 2 to 3 weeks ago. He reports difficulty with strength on the right side of his body, particularly when his hand turns, but he is able to  and lift objects. He denies any difficulty with ambulation. He denies any falls. He expresses a dislike for pain medication. He denies any weight loss over the last year. He denies any history of heart attacks or strokes.   He works for [employer]. He smokes daily. He denies drinking alcohol.   His mother and father had diabetes.    Review of Systems   Musculoskeletal:  Positive for gait problem (balance problems, without falls).   Neurological:  Positive for numbness (rt arm, leg and foot (tingling).        Vitals:    06/18/24 1420   BP: 130/90   Pulse: 67   SpO2: 96%       Neurologic Exam     Mental Status   Speech: speech is normal   Level of consciousness: alert    Cranial Nerves     CN II   Visual fields full to confrontation.     CN III, IV, VI   Pupils are equal, round, and reactive to light.  Extraocular motions are normal.     CN V   Facial sensation intact.     CN VII   Facial expression full, symmetric.     CN VIII   Hearing: intact    CN IX, X   Palate: symmetric    CN XI   Right trapezius strength: normal  Left trapezius strength: normal    CN XII   Tongue: not atrophic  Fasciculations: absent  Tongue deviation: none    Motor Exam   Muscle bulk: normal    Strength    Right deltoid: 5/5  Left deltoid: 5/5  Right biceps: 5/5  Left biceps: 5/5  Right triceps: 5/5  Left triceps: 5/5  Right iliopsoas: 5/5  Left iliopsoas: 5/5  Right quadriceps: 5/5  Left quadriceps: 5/5  Right hamstrin/5  Left hamstrin/5  Right anterior tibial: 5/5  Left anterior tibial: 5/5  Right gastroc: 5/5  Left gastroc: 5/5Grip 5 out of 5 bilaterally     Sensory Exam   Left arm light touch: normal  Left leg light touch: normal  Feel numbness in right arm and hand and right leg foot to LT. Decr vibration right ankle and toes     Gait, Coordination, and Reflexes     Coordination   Romberg: negative  Finger to nose coordination: normal  Heel to shin coordination: normal  Tandem walking coordination: normal    Reflexes   Right brachioradialis: 2+  Left brachioradialis: 2+  Right biceps: 2+  Left biceps: 2+  Right patellar: 3+  Left patellar: 3+  Right achilles: 2+  Left achilles: 2+  Right plantar: equivocal  Left plantar: normal      Physical Exam  Eyes:      Extraocular Movements: EOM normal.      Pupils: Pupils are equal, round, and reactive to light.   Neurological:      Coordination: Finger-Nose-Finger Test, Heel to Shin Test and Romberg Test normal.      Gait: Tandem walk normal.      Deep Tendon Reflexes:      Reflex Scores:       Bicep reflexes are 2+ on the right side and 2+ on the left side.       Brachioradialis reflexes are 2+ on the right side and 2+ on the left side.       Patellar reflexes are 3+ on the right side and 3+ on the left side.       Achilles reflexes are 2+ on the right side and 2+ on the left side.  Psychiatric:         Speech: Speech normal.       Procedures    Assessment/Plan:    Assessment & Plan  1. Numbness and tingling of the right arm and right leg.  The patient's symptoms suggest a possible compression or pinching in the neck, prior CT scan did not visualize the entire CT spine. An EMG nerve conduction test of the right arm and right leg has been ordered. Additional  labs, including B12, folate, and thyroid function, have been ordered to look for nerve health issues. I can see numbness in right lower leg and he has hyperreflexia, would like to rule out compression in the lumbar spine as well via MRI of L spine. MR c-spine ordered to look for findings that would explain his right arm and leg symptoms. Cannot view imaging directly but no stroke reported.    Follow-up  A follow-up appointment is scheduled for 3 months from now to discuss what has been done.     Patient or patient representative verbalized consent for the use of Ambient Listening during the visit with  Roderick Dial MD for chart documentation. 6/18/2024  17:41 EDT     Diagnoses and all orders for this visit:    1. Right sided numbness (Primary)  -     MRI Cervical Spine Without Contrast; Future  -     EMG & Nerve Conduction Test; Future  -     MRI Lumbar Spine Without Contrast; Future  -     Vitamin B12; Future  -     Folate; Future  -     TSH Rfx On Abnormal To Free T4    2. Neck stiffness  -     MRI Cervical Spine Without Contrast; Future  -     EMG & Nerve Conduction Test; Future    3. Hyperreflexia  -     MRI Cervical Spine Without Contrast; Future  -     EMG & Nerve Conduction Test; Future  -     MRI Lumbar Spine Without Contrast; Future    I spent 47 minutes caring for this patient on this date of service. This time includes time spent by me in the following activities as necessary: preparing for the visit, reviewing tests, medical records and previous visits, obtaining and/or reviewing a separately obtained history, performing a medically appropriate exam and/or evaluation, counseling and educating the patient, and/or communicating with other healthcare professionals, documenting information in the medical record, independently interpreting results and communicating that information with the patient, and developing a medically appropriate treatment plan with consideration of other conditions, medications, and  treatments.         Roderick Dial MD

## 2024-06-18 ENCOUNTER — OFFICE VISIT (OUTPATIENT)
Dept: NEUROLOGY | Facility: CLINIC | Age: 59
End: 2024-06-18
Payer: COMMERCIAL

## 2024-06-18 VITALS
OXYGEN SATURATION: 96 % | BODY MASS INDEX: 29.95 KG/M2 | WEIGHT: 197.6 LBS | SYSTOLIC BLOOD PRESSURE: 130 MMHG | HEIGHT: 68 IN | HEART RATE: 67 BPM | DIASTOLIC BLOOD PRESSURE: 90 MMHG

## 2024-06-18 DIAGNOSIS — M43.6 NECK STIFFNESS: ICD-10-CM

## 2024-06-18 DIAGNOSIS — R29.2 HYPERREFLEXIA: ICD-10-CM

## 2024-06-18 DIAGNOSIS — R20.0 RIGHT SIDED NUMBNESS: Primary | ICD-10-CM

## 2024-06-18 PROCEDURE — 99204 OFFICE O/P NEW MOD 45 MIN: CPT | Performed by: STUDENT IN AN ORGANIZED HEALTH CARE EDUCATION/TRAINING PROGRAM

## 2024-06-18 NOTE — LETTER
June 18, 2024     Teo Euceda MD  [address]  [address] [address]    Patient: Mateo Beltran   YOB: 1965   Date of Visit: 6/18/2024     Dear Teo Euceda MD:       Thank you for referring Mateo Beltran to me for evaluation. Below are the relevant portions of my assessment and plan of care.    If you have questions, please do not hesitate to call me. I look forward to following Mateo along with you.         Sincerely,        Roderick Dial MD        CC: Roderick Kothari MD  06/18/24 1801  Sign when Signing Visit  Chief Complaint   Patient presents with   • Numbness     np       Patient ID: Mateo Beltran is a 58 y.o. male.    HPI:    The following portions of the patient's history were reviewed and updated as appropriate: allergies, current medications, past family history, past medical history, past social history, past surgical history and problem list.    Interval history:  History of Present Illness  The patient is a 58-year-old male who presents to neurology clinic as a referral for numbness and tingling of the right arm and right leg.  He was seen by a minimally invasive endoscopic surgeon, Dr. Euceda. He had a symptomatic right inguinal hernia and he reported numbness and tingling over the right side of his body, right upper extremity, right flank, and right lower extremity. He had burning in his arm that radiates down to his hand. The pain gets worse after it is burning over the posterior aspect of the leg and goes down to the calf. He has had symptoms in the past that were similar and he has had some scans to work this up including an MRI and MRA of the head done back in 2022. He has severe chronic small vessel changes, but no signs of stroke reported in the brain in 2022 and this was for right-sided body symptoms back then that he says are the same symptoms he has now. He had a CT done of his neck and cervical spine and it showed limited  "evaluation at several levels, but there is grade 1 anterolisthesis on C2 on C3. There was being hardening artifact related to the shoulders below the level of C4 which limits evaluation.    The patient recounts an incident approximately a year ago (per EMR this is 2022) when he experienced numbness on the right side of his body while sitting. He had hernia surgery which helped with \"nut\" pain.   He also reports pain in his right leg, specifically the bottom of his lower thigh.  He recalls a fall from a roof a few years ago, but did not sustain any known neck injuries.    He has a history of hypertension, which has since improved. He describes his symptoms as numbness and tingling in his right arm, chest, right flank, right side of his back, and entire right leg. He denies any lower back pain or left-sided symptoms. He describes his chest as feeling hard and built up. He denies any neck pain. His symptoms have not worsened over the last year. He works full-time for 5 weekdays. He also reports a sensation of fullness in his right ear, which began concurrent with the numbness in his arm and leg. He denies any right-sided face numbness. His chest and back symptoms began approximately 2 to 3 weeks ago. He reports difficulty with strength on the right side of his body, particularly when his hand turns, but he is able to  and lift objects. He denies any difficulty with ambulation. He denies any falls. He expresses a dislike for pain medication. He denies any weight loss over the last year. He denies any history of heart attacks or strokes.  • He works for [employer]. He smokes daily. He denies drinking alcohol.  • His mother and father had diabetes.    Review of Systems   Musculoskeletal:  Positive for gait problem (balance problems, without falls).   Neurological:  Positive for numbness (rt arm, leg and foot (tingling).        Vitals:    06/18/24 1420   BP: 130/90   Pulse: 67   SpO2: 96%       Neurologic Exam     Mental " Status   Speech: speech is normal   Level of consciousness: alert    Cranial Nerves     CN II   Visual fields full to confrontation.     CN III, IV, VI   Pupils are equal, round, and reactive to light.  Extraocular motions are normal.     CN V   Facial sensation intact.     CN VII   Facial expression full, symmetric.     CN VIII   Hearing: intact    CN IX, X   Palate: symmetric    CN XI   Right trapezius strength: normal  Left trapezius strength: normal    CN XII   Tongue: not atrophic  Fasciculations: absent  Tongue deviation: none    Motor Exam   Muscle bulk: normal    Strength   Right deltoid: 5/5  Left deltoid: 5/5  Right biceps: 5/5  Left biceps: 5/5  Right triceps: 5/5  Left triceps: 5/5  Right iliopsoas: 5/5  Left iliopsoas: 5/5  Right quadriceps: 5/5  Left quadriceps: 5/5  Right hamstrin/5  Left hamstrin/5  Right anterior tibial: 5/5  Left anterior tibial: 5/5  Right gastroc: 5/5  Left gastroc: 5/5Grip 5 out of 5 bilaterally     Sensory Exam   Left arm light touch: normal  Left leg light touch: normal  Feel numbness in right arm and hand and right leg foot to LT. Decr vibration right ankle and toes     Gait, Coordination, and Reflexes     Coordination   Romberg: negative  Finger to nose coordination: normal  Heel to shin coordination: normal  Tandem walking coordination: normal    Reflexes   Right brachioradialis: 2+  Left brachioradialis: 2+  Right biceps: 2+  Left biceps: 2+  Right patellar: 3+  Left patellar: 3+  Right achilles: 2+  Left achilles: 2+  Right plantar: equivocal  Left plantar: normal      Physical Exam  Eyes:      Extraocular Movements: EOM normal.      Pupils: Pupils are equal, round, and reactive to light.   Neurological:      Coordination: Finger-Nose-Finger Test, Heel to Shin Test and Romberg Test normal.      Gait: Tandem walk normal.      Deep Tendon Reflexes:      Reflex Scores:       Bicep reflexes are 2+ on the right side and 2+ on the left side.       Brachioradialis  reflexes are 2+ on the right side and 2+ on the left side.       Patellar reflexes are 3+ on the right side and 3+ on the left side.       Achilles reflexes are 2+ on the right side and 2+ on the left side.  Psychiatric:         Speech: Speech normal.       Procedures    Assessment/Plan:    Assessment & Plan  1. Numbness and tingling of the right arm and right leg.  The patient's symptoms suggest a possible compression or pinching in the neck, prior CT scan did not visualize the entire CT spine. An EMG nerve conduction test of the right arm and right leg has been ordered. Additional labs, including B12, folate, and thyroid function, have been ordered to look for nerve health issues. I can see numbness in right lower leg and he has hyperreflexia, would like to rule out compression in the lumbar spine as well via MRI of L spine. MR c-spine ordered to look for findings that would explain his right arm and leg symptoms. Cannot view imaging directly but no stroke reported.    Follow-up  A follow-up appointment is scheduled for 3 months from now to discuss what has been done.     Patient or patient representative verbalized consent for the use of Ambient Listening during the visit with  Roderick Dial MD for chart documentation. 6/18/2024  17:41 EDT     Diagnoses and all orders for this visit:    1. Right sided numbness (Primary)  -     MRI Cervical Spine Without Contrast; Future  -     EMG & Nerve Conduction Test; Future  -     MRI Lumbar Spine Without Contrast; Future  -     Vitamin B12; Future  -     Folate; Future  -     TSH Rfx On Abnormal To Free T4    2. Neck stiffness  -     MRI Cervical Spine Without Contrast; Future  -     EMG & Nerve Conduction Test; Future    3. Hyperreflexia  -     MRI Cervical Spine Without Contrast; Future  -     EMG & Nerve Conduction Test; Future  -     MRI Lumbar Spine Without Contrast; Future    I spent 47 minutes caring for this patient on this date of service. This time includes time  spent by me in the following activities as necessary: preparing for the visit, reviewing tests, medical records and previous visits, obtaining and/or reviewing a separately obtained history, performing a medically appropriate exam and/or evaluation, counseling and educating the patient, and/or communicating with other healthcare professionals, documenting information in the medical record, independently interpreting results and communicating that information with the patient, and developing a medically appropriate treatment plan with consideration of other conditions, medications, and treatments.         Roderick Dial MD

## 2024-06-18 NOTE — LETTER
June 18, 2024       No Recipients    Patient: Mateo Beltran   YOB: 1965   Date of Visit: 6/18/2024     Dear ROSELIA Keating:       Thank you for referring Mateo Beltran to me for evaluation. Below are the relevant portions of my assessment and plan of care.    If you have questions, please do not hesitate to call me. I look forward to following Mateo along with you.         Sincerely,        Roderick Dial MD        CC:   No Recipients

## 2024-09-06 ENCOUNTER — TELEPHONE (OUTPATIENT)
Dept: NEUROLOGY | Facility: CLINIC | Age: 59
End: 2024-09-06
Payer: COMMERCIAL

## 2024-09-10 ENCOUNTER — TELEPHONE (OUTPATIENT)
Dept: NEUROLOGY | Facility: CLINIC | Age: 59
End: 2024-09-10

## 2024-09-10 NOTE — TELEPHONE ENCOUNTER
LM, asking if pt has had labs & EMG/nerve study that provider ordered  on LOV.  Please call our office. This appt was to be a f/u to discuss results.  If you are have any problems, please keep appt.  When you call back, ask for Natalie.  Thank you

## (undated) DEVICE — GLV SURG BIOGEL LTX PF 7 1/2

## (undated) DEVICE — TIP COVER ACCESSORY

## (undated) DEVICE — SUT MNCRYL PLS ANTIB UD 4/0 PS2 18IN

## (undated) DEVICE — SEAL

## (undated) DEVICE — ANTIBACTERIAL UNDYED BRAIDED (POLYGLACTIN 910), SYNTHETIC ABSORBABLE SUTURE: Brand: COATED VICRYL

## (undated) DEVICE — LOU GENERAL ROBOT: Brand: MEDLINE INDUSTRIES, INC.

## (undated) DEVICE — SOL ANTISTICK CAUTRY ELECTROLUBE LF

## (undated) DEVICE — COVER,MAYO STAND,STERILE: Brand: MEDLINE

## (undated) DEVICE — THE STERILE LIGHT HANDLE COVER IS USED WITH STERIS SURGICAL LIGHTING AND VISUALIZATION SYSTEMS.

## (undated) DEVICE — ENDOPATH XCEL BLADELESS TROCARS WITH STABILITY SLEEVES: Brand: ENDOPATH XCEL

## (undated) DEVICE — COLUMN DRAPE

## (undated) DEVICE — TROCAR SITE CLOSURE DEVICE: Brand: ENDO CLOSE

## (undated) DEVICE — ARM DRAPE

## (undated) DEVICE — LAPAROVUE VISIBILITY SYSTEM LAPAROSCOPIC SOLUTIONS: Brand: LAPAROVUE

## (undated) DEVICE — STPLR SKIN VISISTAT WD 35CT

## (undated) DEVICE — SUT VIC 0 TIES 18IN J912G

## (undated) DEVICE — BLADELESS OBTURATOR: Brand: WECK VISTA